# Patient Record
Sex: FEMALE | URBAN - METROPOLITAN AREA
[De-identification: names, ages, dates, MRNs, and addresses within clinical notes are randomized per-mention and may not be internally consistent; named-entity substitution may affect disease eponyms.]

---

## 2018-11-21 ENCOUNTER — APPOINTMENT (RX ONLY)
Dept: URBAN - METROPOLITAN AREA CLINIC 45 | Facility: CLINIC | Age: 23
Setting detail: DERMATOLOGY
End: 2018-11-21

## 2018-11-21 DIAGNOSIS — Q82.5 CONGENITAL NON-NEOPLASTIC NEVUS: ICD-10-CM

## 2018-11-21 DIAGNOSIS — L91.8 OTHER HYPERTROPHIC DISORDERS OF THE SKIN: ICD-10-CM

## 2018-11-21 PROCEDURE — 99203 OFFICE O/P NEW LOW 30 MIN: CPT

## 2018-11-21 PROCEDURE — ? TREATMENT REGIMEN

## 2018-11-21 PROCEDURE — ? COUNSELING

## 2018-11-21 ASSESSMENT — LOCATION ZONE DERM
LOCATION ZONE: EYELID
LOCATION ZONE: TRUNK

## 2018-11-21 ASSESSMENT — LOCATION SIMPLE DESCRIPTION DERM
LOCATION SIMPLE: LEFT SUPERIOR EYELID
LOCATION SIMPLE: RIGHT BACK

## 2018-11-21 ASSESSMENT — LOCATION DETAILED DESCRIPTION DERM
LOCATION DETAILED: LEFT MEDIAL SUPERIOR EYELID
LOCATION DETAILED: RIGHT SUPERIOR LATERAL UPPER BACK

## 2018-11-21 NOTE — PROCEDURE: TREATMENT REGIMEN
Plan: Location: right posterior shoulder\\n\\nPatient reports previous mole that was frozen before and then biopsied after it grew larger\\nPatient reports she does not remember what the biopsy came out as but it was benign and the dermatologist at the time said there was no further treatment needed\\nDiscussed with patient to have the biopsy report sent to us so we can have that on file\\nPatient expressed understanding and agreed with plan\\n\\nF/u as needed
Detail Level: Zone
Plan: Location: left eyelid\\n\\nPatient presents skin tag on left eyelid for a few months\\nDiscussed with patient we can freeze it with LN2 to remove it or it can be subjected to growing larger \\nPatient very hesitant to remove it as she wants to avoid treatment as it is a benign growth\\nDiscussed with patient we can remove anytime she wants, and will need to make an appointment when the time comes \\nPatient expressed understanding and agreed with plan \\n\\nF/u as needed

## 2019-05-08 ENCOUNTER — APPOINTMENT (RX ONLY)
Dept: URBAN - METROPOLITAN AREA CLINIC 45 | Facility: CLINIC | Age: 24
Setting detail: DERMATOLOGY
End: 2019-05-08

## 2019-05-08 DIAGNOSIS — B35.1 TINEA UNGUIUM: ICD-10-CM

## 2019-05-08 DIAGNOSIS — L85.3 XEROSIS CUTIS: ICD-10-CM

## 2019-05-08 PROCEDURE — ? PRESCRIPTION

## 2019-05-08 PROCEDURE — 99213 OFFICE O/P EST LOW 20 MIN: CPT

## 2019-05-08 PROCEDURE — ? COUNSELING

## 2019-05-08 PROCEDURE — ? TREATMENT REGIMEN

## 2019-05-08 RX ORDER — ECONAZOLE NITRATE 10 MG/G
AEROSOL, FOAM TOPICAL
Qty: 1 | Refills: 4 | Status: ERX | COMMUNITY
Start: 2019-05-08

## 2019-05-08 RX ADMIN — ECONAZOLE NITRATE: 10 AEROSOL, FOAM TOPICAL at 19:46

## 2019-05-08 ASSESSMENT — LOCATION DETAILED DESCRIPTION DERM
LOCATION DETAILED: RIGHT 3RD TOENAIL
LOCATION DETAILED: RIGHT 4TH TOENAIL
LOCATION DETAILED: LEFT LATERAL GREAT TOE

## 2019-05-08 ASSESSMENT — LOCATION SIMPLE DESCRIPTION DERM
LOCATION SIMPLE: RIGHT 3RD TOE
LOCATION SIMPLE: LEFT GREAT TOE
LOCATION SIMPLE: RIGHT 4TH TOE

## 2019-05-08 ASSESSMENT — LOCATION ZONE DERM
LOCATION ZONE: TOENAIL
LOCATION ZONE: TOE

## 2019-05-08 NOTE — PROCEDURE: TREATMENT REGIMEN
Plan: Location: Rt. 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 \\nDiscussed with patient that the constant trauma to her toenails caused by the constant friction and rubbing against the shoes, is exacerbating this fungal infection and making it hard for her toenails to recover.\\nAdvised patient to get boots that actually fit her feet, and discussed with her  that we will start her on a topical anti fungal that she can use daily to hopefully clear the infection. \\nDiscussed with patient that there are oral medications that we can try in the future if she does not respond to the Ecoza foam. \\nWill follow up in 6-8 weeks.
Detail Level: Zone